# Patient Record
Sex: MALE | Race: WHITE | Employment: STUDENT | URBAN - METROPOLITAN AREA
[De-identification: names, ages, dates, MRNs, and addresses within clinical notes are randomized per-mention and may not be internally consistent; named-entity substitution may affect disease eponyms.]

---
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
## 2022-02-18 ENCOUNTER — TRANSCRIBE ORDER (OUTPATIENT)
Dept: SCHEDULING | Age: 22
End: 2022-02-18

## 2022-02-18 DIAGNOSIS — S43.005A DISLOCATION OF LEFT SHOULDER JOINT: Primary | ICD-10-CM

## 2022-02-21 ENCOUNTER — TRANSCRIBE ORDER (OUTPATIENT)
Dept: SCHEDULING | Age: 22
End: 2022-02-21

## 2022-02-21 DIAGNOSIS — S43.005A SHOULDER DISLOCATION, LEFT, INITIAL ENCOUNTER: Primary | ICD-10-CM

## 2022-02-23 ENCOUNTER — HOSPITAL ENCOUNTER (OUTPATIENT)
Dept: MRI IMAGING | Age: 22
Discharge: HOME | End: 2022-02-23
Attending: ORTHOPAEDIC SURGERY
Payer: COMMERCIAL

## 2022-02-23 ENCOUNTER — TRANSCRIBE ORDER (OUTPATIENT)
Dept: SCHEDULING | Age: 22
End: 2022-02-23

## 2022-02-23 ENCOUNTER — HOSPITAL ENCOUNTER (OUTPATIENT)
Dept: GENERAL RADIOLOGY | Age: 22
Discharge: HOME | End: 2022-02-23
Attending: ORTHOPAEDIC SURGERY
Payer: COMMERCIAL

## 2022-02-23 DIAGNOSIS — S43.005A SHOULDER DISLOCATION, LEFT, INITIAL ENCOUNTER: ICD-10-CM

## 2022-02-23 DIAGNOSIS — S43.005A DISLOCATION OF LEFT SHOULDER JOINT: ICD-10-CM

## 2022-02-23 DIAGNOSIS — S43.005A DISLOCATION OF LEFT SHOULDER JOINT: Primary | ICD-10-CM

## 2022-02-23 PROCEDURE — 23350 INJECTION FOR SHOULDER X-RAY: CPT

## 2022-02-23 PROCEDURE — 74011000250 HC RX REV CODE- 250

## 2022-02-23 PROCEDURE — 74011250636 HC RX REV CODE- 250/636

## 2022-02-23 PROCEDURE — 73222 MRI JOINT UPR EXTREM W/DYE: CPT

## 2022-02-23 PROCEDURE — 74011000636 HC RX REV CODE- 636: Performed by: ORTHOPAEDIC SURGERY

## 2022-02-23 PROCEDURE — 74011000250 HC RX REV CODE- 250: Performed by: ORTHOPAEDIC SURGERY

## 2022-02-23 PROCEDURE — A9576 INJ PROHANCE MULTIPACK: HCPCS

## 2022-02-23 RX ORDER — LIDOCAINE HYDROCHLORIDE 10 MG/ML
INJECTION, SOLUTION EPIDURAL; INFILTRATION; INTRACAUDAL; PERINEURAL
Status: COMPLETED
Start: 2022-02-23 | End: 2022-02-23

## 2022-02-23 RX ORDER — LIDOCAINE HYDROCHLORIDE 10 MG/ML
INJECTION, SOLUTION EPIDURAL; INFILTRATION; INTRACAUDAL; PERINEURAL
Status: DISCONTINUED
Start: 2022-02-23 | End: 2022-02-23 | Stop reason: WASHOUT

## 2022-02-23 RX ORDER — LIDOCAINE HYDROCHLORIDE 10 MG/ML
10 INJECTION, SOLUTION EPIDURAL; INFILTRATION; INTRACAUDAL; PERINEURAL
Status: COMPLETED | OUTPATIENT
Start: 2022-02-23 | End: 2022-02-23

## 2022-02-23 RX ORDER — SODIUM BICARBONATE 42 MG/ML
3 INJECTION, SOLUTION INTRAVENOUS
Status: COMPLETED | OUTPATIENT
Start: 2022-02-23 | End: 2022-02-23

## 2022-02-23 RX ORDER — SODIUM CHLORIDE 9 MG/ML
3 INJECTION INTRAMUSCULAR; INTRAVENOUS; SUBCUTANEOUS
Status: COMPLETED | OUTPATIENT
Start: 2022-02-23 | End: 2022-02-23

## 2022-02-23 RX ADMIN — SODIUM CHLORIDE 3 ML: 9 INJECTION, SOLUTION INTRAMUSCULAR; INTRAVENOUS; SUBCUTANEOUS at 14:39

## 2022-02-23 RX ADMIN — GADOTERIDOL 2 ML: 279.3 INJECTION, SOLUTION INTRAVENOUS at 14:57

## 2022-02-23 RX ADMIN — IOHEXOL 15 ML: 300 INJECTION, SOLUTION INTRAVENOUS at 14:38

## 2022-02-23 RX ADMIN — SODIUM BICARBONATE 3 MG: 42 INJECTION, SOLUTION INTRAVENOUS at 14:40

## 2022-02-23 RX ADMIN — LIDOCAINE HYDROCHLORIDE 5 ML: 10 INJECTION, SOLUTION EPIDURAL; INFILTRATION; INTRACAUDAL; PERINEURAL at 14:40

## 2022-02-26 ENCOUNTER — HOSPITAL ENCOUNTER (EMERGENCY)
Age: 22
Discharge: HOME | End: 2022-02-26
Attending: PEDIATRICS
Payer: COMMERCIAL

## 2022-02-26 VITALS
HEART RATE: 86 BPM | RESPIRATION RATE: 18 BRPM | SYSTOLIC BLOOD PRESSURE: 144 MMHG | DIASTOLIC BLOOD PRESSURE: 78 MMHG | BODY MASS INDEX: 25.48 KG/M2 | WEIGHT: 167.55 LBS | OXYGEN SATURATION: 99 % | TEMPERATURE: 98 F

## 2022-02-26 DIAGNOSIS — L02.01 ABSCESS OF CHIN: Primary | ICD-10-CM

## 2022-02-26 PROCEDURE — 87077 CULTURE AEROBIC IDENTIFY: CPT

## 2022-02-26 PROCEDURE — 87205 SMEAR GRAM STAIN: CPT

## 2022-02-26 PROCEDURE — 90471 IMMUNIZATION ADMIN: CPT

## 2022-02-26 PROCEDURE — 87186 SC STD MICRODIL/AGAR DIL: CPT

## 2022-02-26 PROCEDURE — 90715 TDAP VACCINE 7 YRS/> IM: CPT | Performed by: PEDIATRICS

## 2022-02-26 PROCEDURE — 74011000250 HC RX REV CODE- 250: Performed by: PEDIATRICS

## 2022-02-26 PROCEDURE — 99284 EMERGENCY DEPT VISIT MOD MDM: CPT

## 2022-02-26 PROCEDURE — 74011250637 HC RX REV CODE- 250/637: Performed by: PEDIATRICS

## 2022-02-26 PROCEDURE — 74011250636 HC RX REV CODE- 250/636: Performed by: PEDIATRICS

## 2022-02-26 RX ORDER — DOXYCYCLINE HYCLATE 100 MG
100 TABLET ORAL 2 TIMES DAILY
Qty: 20 TABLET | Refills: 0 | Status: SHIPPED | OUTPATIENT
Start: 2022-02-26 | End: 2022-03-08

## 2022-02-26 RX ORDER — IBUPROFEN 800 MG/1
800 TABLET ORAL
COMMUNITY
Start: 2022-02-21 | End: 2022-03-03

## 2022-02-26 RX ORDER — LIDOCAINE 40 MG/G
CREAM TOPICAL
Status: COMPLETED | OUTPATIENT
Start: 2022-02-26 | End: 2022-02-26

## 2022-02-26 RX ORDER — CYCLOBENZAPRINE HCL 10 MG
10 TABLET ORAL 3 TIMES DAILY
COMMUNITY
Start: 2022-02-21 | End: 2022-03-03

## 2022-02-26 RX ORDER — LIDOCAINE 40 MG/G
CREAM TOPICAL
Status: DISCONTINUED | OUTPATIENT
Start: 2022-02-26 | End: 2022-02-26

## 2022-02-26 RX ORDER — HYDROCODONE BITARTRATE AND ACETAMINOPHEN 5; 325 MG/1; MG/1
1 TABLET ORAL
COMMUNITY
Start: 2022-02-14 | End: 2022-05-06

## 2022-02-26 RX ORDER — DOXYCYCLINE HYCLATE 100 MG
100 TABLET ORAL
Status: COMPLETED | OUTPATIENT
Start: 2022-02-26 | End: 2022-02-26

## 2022-02-26 RX ADMIN — TETANUS TOXOID, REDUCED DIPHTHERIA TOXOID AND ACELLULAR PERTUSSIS VACCINE, ADSORBED 0.5 ML: 5; 2.5; 8; 8; 2.5 SUSPENSION INTRAMUSCULAR at 16:41

## 2022-02-26 RX ADMIN — LIDOCAINE 4%: 4 CREAM TOPICAL at 15:27

## 2022-02-26 RX ADMIN — DOXYCYCLINE HYCLATE 100 MG: 100 TABLET, COATED ORAL at 16:40

## 2022-02-26 NOTE — ED NOTES
Patient medicated with first dose of doxycycline and tdap booster. Patient provided vaccine info sheet. No further needs expressed at this time.

## 2022-02-26 NOTE — DISCHARGE INSTRUCTIONS
Take antibiotics as prescribed.       Up with your physician in 1 to 2 days    Return to the emergency department for any worsening symptoms including any trouble breathing, fevers, redness or swelling of area of abscess, any feeling of fullness or difficulty swallowing, inability to swallow saliva, pain or other new concerns

## 2022-02-26 NOTE — ED TRIAGE NOTES
Triage: patient reports he popped pimple under chin on 2/22/2022. Patient reports pimple has grown in size since then and is painful.  Fever on 2/23

## 2022-02-26 NOTE — ED NOTES
Abscess dressed with sterile 4x4 and tape. Patient educated on plan of care regarding care of wound at home. Patient verbalizes understanding and expresses no needs at this time. Pt discharged home. Pt acting age appropriately, respirations regular and unlabored, cap refill less than two seconds. Skin warm, dry, and intact. Lungs clear bilaterally. No further complaints at this time. Patient verbalized understanding of discharge paperwork and has no further questions at this time. Education provided about continuation of care, follow up care and medication administration. Patient able to provide teach back about discharge instructions.

## 2022-02-26 NOTE — ED PROVIDER NOTES
HPI           Please note that this dictation was completed with Dragon, computer voice recognition software. Quite often unanticipated grammatical, syntax, homophones, and other interpretive errors are inadvertently transcribed by the computer software. Please disregard these errors. Additionally, please excuse any errors that have escaped final proofreading. History of present illness:    Patient is a 29-year-old male previously well presents to the ED with complaints of drainage abscess beneath his chin. He states he was in his usual state of good health until approximately 4 days earlier when he popped a pimple. He states he expressed large amount of yellowish material which then became bloody. Since that time he states that it has increased in size and is still draining. Patient states he had a fever on 2/23 which was 3 days earlier x1 day patient felt hot positive tactile temp no documentation performed. He has been afebrile since. Patient now presents to the ED for reevaluation. No fevers no vomiting no diarrhea. No trouble swallowing or eating. Able to eat and drink well without any problems. No headache no change in vision no neck pain no swelling of neck. Positive yellowish discharge from abscess beneath chin. No tooth pain. No chest pain no abdominal pain no nausea no vomiting no dysuria. No other complaints no modifying factors no other concerns    Review of systems: A 10 point review was conducted. All pertinent positive and negatives are as stated in the HPI  Allergies: None  Medications: Cyclobenzaprine ibuprofen  Immunizations: Up-to-date has had only one Covid vaccine  Past medical history: Positive for left dislocated shoulder which was relocated in ER and followed by Ortho.   Patient currently in sling but no problems with shoulder  Family history: Noncontributory to this visit  Social history: Lives with family denies drug use  Past Medical History:   Diagnosis Date    Depression     GERD (gastroesophageal reflux disease)     Headache        History reviewed. No pertinent surgical history. Family History:   Problem Relation Age of Onset    Hypertension Mother    Jules Weinberg Migraines Mother     No Known Problems Father        Social History     Socioeconomic History    Marital status: SINGLE     Spouse name: Not on file    Number of children: Not on file    Years of education: Not on file    Highest education level: Not on file   Occupational History    Not on file   Tobacco Use    Smoking status: Never Smoker    Smokeless tobacco: Current User   Vaping Use    Vaping Use: Some days    Substances: THC   Substance and Sexual Activity    Alcohol use: Yes     Comment: 1/week    Drug use: Yes     Types: Marijuana     Comment: around once per week    Sexual activity: Yes     Partners: Male   Other Topics Concern    Not on file   Social History Narrative    Not on file     Social Determinants of Health     Financial Resource Strain:     Difficulty of Paying Living Expenses: Not on file   Food Insecurity:     Worried About Running Out of Food in the Last Year: Not on file    Marisol of Food in the Last Year: Not on file   Transportation Needs:     Lack of Transportation (Medical): Not on file    Lack of Transportation (Non-Medical):  Not on file   Physical Activity:     Days of Exercise per Week: Not on file    Minutes of Exercise per Session: Not on file   Stress:     Feeling of Stress : Not on file   Social Connections:     Frequency of Communication with Friends and Family: Not on file    Frequency of Social Gatherings with Friends and Family: Not on file    Attends Sikh Services: Not on file    Active Member of Clubs or Organizations: Not on file    Attends Club or Organization Meetings: Not on file    Marital Status: Not on file   Intimate Partner Violence:     Fear of Current or Ex-Partner: Not on file    Emotionally Abused: Not on file   Jules Sultana Physically Abused: Not on file    Sexually Abused: Not on file   Housing Stability:     Unable to Pay for Housing in the Last Year: Not on file    Number of Places Lived in the Last Year: Not on file    Unstable Housing in the Last Year: Not on file         ALLERGIES: Patient has no known allergies. Review of Systems   Constitutional: Positive for fever. Negative for activity change and appetite change. HENT: Negative for congestion, rhinorrhea, sore throat and trouble swallowing. Eyes: Negative for pain, redness and visual disturbance. Respiratory: Negative for cough and shortness of breath. Gastrointestinal: Negative for abdominal pain, diarrhea and vomiting. Genitourinary: Negative for decreased urine volume and difficulty urinating. Musculoskeletal: Negative for gait problem. Skin: Positive for wound. Neurological: Negative for weakness. All other systems reviewed and are negative. Vitals:    02/26/22 1509 02/26/22 1510 02/26/22 1650 02/26/22 1710   BP: (!) 157/98  (!) 138/90 (!) 144/78   Pulse: 83   86   Resp: 18   18   Temp: 98 °F (36.7 °C)   98 °F (36.7 °C)   SpO2: 100%   99%   Weight:  76 kg (167 lb 8.8 oz)              Physical Exam  Vitals and nursing note reviewed. PE:  GEN:  WDWN male alert non toxic in NAD talkative interactive well appearing, normal voice  SK: CRT < 2 sec, good distal pulses. No lesions, no rashes  HEENT: H: AT/NC. E: EOMI , PERRL, E: TM clear  N/T: Clear oropharynx, teeth intact, no submental swelling/flucuance  Chin: about 2 cm distally from chin,  + 1x2 CM  Soft, fluctuant abscess actively draining dark yellow/brownish exudate, no surrounding erythema, + bruising of skin ( previous squeezing by pt)  NECK: supple, no meningismus. No pain on palpation, no redness,s no cellulitis, no swelling/deformity  Chest: Clear to auscultation, clear BS. NO rales, rhonchi, wheezes or distress. No   Retraction.   Chest Wall: no tenderness on palpation  CV: Regular rate and rhythm. Normal S1 S2 . No murmur, gallops or thrills  ABD: Soft non tender, no hepatomegaly, good bowel sound, no guarding, benig  MS: FROM all extremities, no long bone tenderness. No swelling, cyanosis, no edema. Good distal pulses. Gait normal  NEURO: Alert. No focality. Cranial nerves 2-12 grossly intact. GCS 15  Behavior and mentation appropriate for age        MDM  Number of Diagnoses or Management Options  Abscess of chin  Diagnosis management comments: Medical decision making:    Patient with abscess secondary to his self popping of acne. After L MX applied abscess spontaneously drainage, large amount expressed with mild pressure manually. See procedure note    Wound cleansed again with Betadine and irrigated NS, dressing placed    First dose doxycycline given in ER    Tetanus also given to patient    All precautions reviewed with patient. He is understanding and agreeable to plan. He will follow-up with PCP or return to the ER in 1 to 2 days for reevaluation and return immediately for any worsening symptoms including any trouble breathing, fevers, redness or swelling of neck, any inability to swallow liquids food or his saliva, or other new concerns    Patient states recently moved to [address] [facility] and does not have physician    Patient also states that he will be going on spring break to the beach in 3 days.   Advised patient not to go in the ocean keep wound clean and dry and no drinking alcohol while on doxycycline    Clinical impression:  Abscess chin       Amount and/or Complexity of Data Reviewed  Clinical lab tests: ordered           I&D Abcess Simple    Date/Time: 2/26/2022 6:52 PM  Performed by: Fernando Rascon MD  Authorized by: Fernando Rascon MD     Consent:     Consent obtained:  Verbal    Consent given by:  Patient    Risks discussed:  Bleeding, pain, infection and damage to other organs    Alternatives discussed:  No treatment  Universal protocol:     Procedure explained and questions answered to patient or proxy's satisfaction: yes      Relevant documents present and verified: yes      Site/side marked: yes      Immediately prior to procedure a time out was called: yes      Patient identity confirmed:  Verbally with patient and arm band  Location:     Type:  Abscess    Size:  2cm    Location: chin/upperneck. Pre-procedure details:     Skin preparation:  Betadine  Sedation:     Sedation type: none. Anesthesia (see MAR for exact dosages): Anesthesia method:  Topical application    Topical anesthetic:  EMLA cream  Procedure type:     Complexity:  Simple  Procedure details:     Needle aspiration: no      Drainage:  Bloody and purulent    Drainage amount:  Copious    Wound treatment:  Wound left open    Packing materials:  None  Post-procedure details:     Patient tolerance of procedure:   Tolerated well, no immediate complications  Comments:      Abscess - spontaneously draining, no incision required

## 2022-02-28 ENCOUNTER — HOSPITAL ENCOUNTER (EMERGENCY)
Age: 22
Discharge: HOME | End: 2022-02-28
Attending: EMERGENCY MEDICINE
Payer: COMMERCIAL

## 2022-02-28 VITALS
BODY MASS INDEX: 25.48 KG/M2 | RESPIRATION RATE: 17 BRPM | WEIGHT: 167.55 LBS | SYSTOLIC BLOOD PRESSURE: 145 MMHG | DIASTOLIC BLOOD PRESSURE: 87 MMHG | TEMPERATURE: 97.9 F | HEART RATE: 97 BPM | OXYGEN SATURATION: 99 %

## 2022-02-28 DIAGNOSIS — L02.01 ABSCESS OF CHIN: Primary | ICD-10-CM

## 2022-02-28 LAB
BACTERIA SPEC CULT: ABNORMAL
BACTERIA SPEC CULT: ABNORMAL
GRAM STN SPEC: ABNORMAL
GRAM STN SPEC: ABNORMAL
SERVICE CMNT-IMP: ABNORMAL

## 2022-02-28 PROCEDURE — 74011000250 HC RX REV CODE- 250: Performed by: NURSE PRACTITIONER

## 2022-02-28 PROCEDURE — 99283 EMERGENCY DEPT VISIT LOW MDM: CPT

## 2022-02-28 RX ORDER — MUPIROCIN CALCIUM 20 MG/G
CREAM TOPICAL 2 TIMES DAILY
Qty: 15 G | Refills: 0 | Status: SHIPPED | OUTPATIENT
Start: 2022-02-28 | End: 2022-03-10

## 2022-02-28 RX ORDER — LIDOCAINE 40 MG/G
CREAM TOPICAL
Status: COMPLETED | OUTPATIENT
Start: 2022-02-28 | End: 2022-02-28

## 2022-02-28 RX ADMIN — LIDOCAINE 4%: 4 CREAM TOPICAL at 16:41

## 2022-02-28 NOTE — ED PROVIDER NOTES
This is a 72-year-old male with history of depression, GERD and migraines seen here on 2/26 for an abscess underneath his chin. He does not have any PCP for follow-up/repeat exam so he came here. He states that the wound opened spontaneously after numbing medication was applied to it. He was able to continue draining for about 24 hours and then it did close up. He said he has been performing warm compresses and washing it with soap and water as well. He does feel like the pain has improved and the swelling and redness has improved as well. No fevers. No neck pain or decreased neck movements. He has been taking doxyclycline, no complaints or side effects. Past medical history: Depression, GERD, migraines  Social: Vaccines up-to-date lives in with family    The history is provided by the patient. Skin Problem  Pertinent negatives include no chest pain and no headaches. Past Medical History:   Diagnosis Date    Depression     GERD (gastroesophageal reflux disease)     Headache        No past surgical history on file.       Family History:   Problem Relation Age of Onset    Hypertension Mother    [facility] Migraines Mother     No Known Problems Father        Social History     Socioeconomic History    Marital status: SINGLE     Spouse name: Not on file    Number of children: Not on file    Years of education: Not on file    Highest education level: Not on file   Occupational History    Not on file   Tobacco Use    Smoking status: Never Smoker    Smokeless tobacco: Current User   Vaping Use    Vaping Use: Some days    Substances: THC   Substance and Sexual Activity    Alcohol use: Yes     Comment: 1/week    Drug use: Yes     Types: Marijuana     Comment: around once per week    Sexual activity: Yes     Partners: Male   Other Topics Concern    Not on file   Social History Narrative    Not on file     Social Determinants of Health     Financial Resource Strain:     Difficulty of Paying Living Expenses: Not on file   Food Insecurity:     Worried About Running Out of Food in the Last Year: Not on file    Marisol of Food in the Last Year: Not on file   Transportation Needs:     Lack of Transportation (Medical): Not on file    Lack of Transportation (Non-Medical): Not on file   Physical Activity:     Days of Exercise per Week: Not on file    Minutes of Exercise per Session: Not on file   Stress:     Feeling of Stress : Not on file   Social Connections:     Frequency of Communication with Friends and Family: Not on file    Frequency of Social Gatherings with Friends and Family: Not on file    Attends Rastafarian Services: Not on file    Active Member of [employer] or Organizations: Not on file    Attends Club or Organization Meetings: Not on file    Marital Status: Not on file   Intimate Partner Violence:     Fear of Current or Ex-Partner: Not on file    Emotionally Abused: Not on file    Physically Abused: Not on file    Sexually Abused: Not on file   Housing Stability:     Unable to Pay for Housing in the Last Year: Not on file    Number of [place] in the Last Year: Not on file    Unstable Housing in the Last Year: Not on file         ALLERGIES: Patient has no known allergies. Review of Systems   Constitutional: Negative. Negative for activity change, appetite change and fever. HENT: Negative. Negative for sore throat. Respiratory: Negative. Negative for cough and wheezing. Cardiovascular: Negative. Negative for chest pain. Gastrointestinal: Negative. Negative for diarrhea and vomiting. Genitourinary: Negative. Musculoskeletal: Negative. Negative for back pain and neck pain. Skin: Positive for wound. Negative for rash. Chin abscess   Neurological: Negative. Negative for headaches. All other systems reviewed and are negative.       Vitals:    02/28/22 1445 02/28/22 1447   BP:  (!) 145/87   Pulse:  97   Resp:  17   Temp:  97.9 °F (36.6 °C)   SpO2:  99% Weight: 76 kg (167 lb 8.8 oz)             Physical Exam  Vitals and nursing note reviewed. Constitutional:       Appearance: Normal appearance. Musculoskeletal:         General: Normal range of motion. Skin:     General: Skin is warm. Capillary Refill: Capillary refill takes less than 2 seconds. Findings: Abscess present. Comments: Small (about 1 cm) semi firm area midline anterior neck; no surrounding erythema or tenderness to palpation; some crusted drainage remains on skin. Neurological:      General: No focal deficit present. Mental Status: He is alert. Mental status is at baseline. Psychiatric:         Mood and Affect: Mood normal.          MDM  Number of Diagnoses or Management Options  Abscess of chin  Diagnosis management comments: This is a 42-year-old male with anterior midline neck abscess that was spontaneously drained after topical anesthetic applied 2 days ago. It did close up and stopped draining. On my exam there is still very small area of induration under the skin given that there is some dried crusted drainage will apply topical anesthetic in hopes that it will reopen and be able to drain more. He is currently taking doxycycline and I reviewed the culture and sensitivities which was responsive to doxycycline he had positive MRSA. Amount and/or Complexity of Data Reviewed  Review and summarize past medical records: yes    Risk of Complications, Morbidity, and/or Mortality  Presenting problems: moderate  Diagnostic procedures: moderate  Management options: moderate    Patient Progress  Patient progress: stable         Procedures    LMX applied, wound did not open or drain anything else; He denies pain at this time and it is not indurated or fluctuant so will keep him on doxycycline and topical bactroban. We discussed return for worsening symptoms or pain. Patient's results have been reviewed with them.  Patient and /or family have verbally conveyed understanding and agreement of the patient's signs, symptoms, diagnosis, treatment and prognosis and additionally agree to follow up as recommended or return to the Emergency Department should their condition change prior to follow-up. Discharge instructions have also been provided to the patient with some educational information regarding their diagnosis as well as a list of reasons why they would want to return to the ER prior to their follow-up appointment should their condition change.

## 2022-02-28 NOTE — ED TRIAGE NOTES
Triage note: Patient had skin abscess drained earlier this week, here for follow up b/c he does not have PCP.

## 2022-02-28 NOTE — DISCHARGE INSTRUCTIONS
Continue antibiotics as prescribed (the culture/bacteria grew MRSA and the doxycycline is a good antibiotic for that bacteria)  Continue warm compresses on abscess to try to keep wound open and draining as much as possible  Only need to return for any concerns or worsening symptoms.

## 2022-02-28 NOTE — LETTER
[address]  [address] [facility] DEPT  [address] [facility] [address]  [phone]    Work/School Note    Date: 2/28/2022    To Whom It May concern:    Selene Ray was seen and treated today in the emergency room by the following provider(s):  Attending Provider: Christine Palmer MD  Nurse Practitioner: Jade Luz NP. Selene Ray may return to school on 3/1/22.     Sincerely,          Romaine Cam NP

## 2022-05-06 ENCOUNTER — HOSPITAL ENCOUNTER (EMERGENCY)
Age: 22
Discharge: HOME | End: 2022-05-06
Attending: PEDIATRICS
Payer: COMMERCIAL

## 2022-05-06 ENCOUNTER — APPOINTMENT (OUTPATIENT)
Dept: GENERAL RADIOLOGY | Age: 22
End: 2022-05-06
Attending: PEDIATRICS
Payer: COMMERCIAL

## 2022-05-06 VITALS
WEIGHT: 160.5 LBS | DIASTOLIC BLOOD PRESSURE: 78 MMHG | HEART RATE: 95 BPM | RESPIRATION RATE: 16 BRPM | BODY MASS INDEX: 24.4 KG/M2 | TEMPERATURE: 98 F | SYSTOLIC BLOOD PRESSURE: 168 MMHG | OXYGEN SATURATION: 99 %

## 2022-05-06 DIAGNOSIS — M25.512 ACUTE PAIN OF LEFT SHOULDER: Primary | ICD-10-CM

## 2022-05-06 PROCEDURE — 74011250637 HC RX REV CODE- 250/637: Performed by: PEDIATRICS

## 2022-05-06 PROCEDURE — 73030 X-RAY EXAM OF SHOULDER: CPT

## 2022-05-06 PROCEDURE — 99283 EMERGENCY DEPT VISIT LOW MDM: CPT

## 2022-05-06 RX ORDER — HYDROCODONE BITARTRATE AND ACETAMINOPHEN 5; 325 MG/1; MG/1
1 TABLET ORAL
Qty: 12 TABLET | Refills: 0 | Status: SHIPPED | OUTPATIENT
Start: 2022-05-06 | End: 2022-05-09

## 2022-05-06 RX ORDER — HYDROCODONE BITARTRATE AND ACETAMINOPHEN 5; 325 MG/1; MG/1
1 TABLET ORAL
Status: COMPLETED | OUTPATIENT
Start: 2022-05-06 | End: 2022-05-06

## 2022-05-06 RX ORDER — METHOCARBAMOL 500 MG/1
500 TABLET, FILM COATED ORAL 2 TIMES DAILY
COMMUNITY
Start: 2022-03-03

## 2022-05-06 RX ADMIN — HYDROCODONE BITARTRATE AND ACETAMINOPHEN 1 TABLET: 5; 325 TABLET ORAL at 20:45

## 2022-05-06 NOTE — ED TRIAGE NOTES
Triage Note: Pt reports he has dislocated shoulder twice. Pt reports he woke up this morning and left shoulder felt funny. He is worried it is partially out of place.

## 2022-05-07 NOTE — ED PROVIDER NOTES
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 n sling now and did maneuvers at home and it is somewhat better now. Still painful. No New injury. Has ortho appointment in 2 days. NO recent illness. No Neuropathy now. IMM UTD    Past Medical History:   Diagnosis Date    Depression     GERD (gastroesophageal reflux disease)     Headache        History reviewed. No pertinent surgical history. Family History:   Problem Relation Age of Onset    Hypertension Mother    [facility] Migraines Mother     No Known Problems Father        Social History     Socioeconomic History    Marital status: SINGLE     Spouse name: Not on file    Number of children: Not on file    Years of education: Not on file    Highest education level: Not on file   Occupational History    Not on file   Tobacco Use    Smoking status: Never Smoker    Smokeless tobacco: Current User   Vaping Use    Vaping Use: Some days    Substances: THC   Substance and Sexual Activity    Alcohol use: Yes     Comment: 1/week    Drug use: Yes     Types: Marijuana     Comment: around once per week    Sexual activity: Yes     Partners: Male   Other Topics Concern    Not on file   Social History Narrative    Not on file     Social Determinants of Health     Financial Resource Strain:     Difficulty of Paying Living Expenses: Not on file   Food Insecurity:     Worried About Running Out of Food in the Last Year: Not on file    Marisol of Food in the Last Year: Not on file   Transportation Needs:     Lack of Transportation (Medical): Not on file    Lack of Transportation (Non-Medical):  Not on file   Physical Activity:     Days of Exercise per Week: Not on file    Minutes of Exercise per Session: Not on file   Stress:     Feeling of Stress : Not on file   Social Connections:  Frequency of Communication with Friends and Family: Not on file    Frequency of Social Gatherings with Friends and Family: Not on file    Attends Restoration Services: Not on file    Active Member of Clubs or Organizations: Not on file    Attends Club or Organization Meetings: Not on file    Marital Status: Not on file   Intimate Partner Violence:     Fear of Current or Ex-Partner: Not on file    Emotionally Abused: Not on file    Physically Abused: Not on file    Sexually Abused: Not on file   Housing Stability:     Unable to Pay for Housing in the Last Year: Not on file    Number of [place] in the Last Year: Not on file    Unstable Housing in the Last Year: Not on file         ALLERGIES: Patient has no known allergies. Review of Systems   Constitutional: Negative for activity change and fatigue. HENT: Negative for sore throat. Eyes: Negative for visual disturbance. Respiratory: Negative for chest tightness and shortness of breath. Cardiovascular: Negative for chest pain. Gastrointestinal: Negative for abdominal pain. Musculoskeletal: Positive for arthralgias and myalgias. Negative for joint swelling. Skin: Negative for rash. Allergic/Immunologic: Negative for immunocompromised state. Neurological: Negative for light-headedness and headaches. Hematological: Does not bruise/bleed easily. Psychiatric/Behavioral: The patient is nervous/anxious. Vitals:    05/06/22 1901   BP: (!) 168/78   Pulse: 95   Resp: 16   Temp: 98 °F (36.7 °C)   SpO2: 99%   Weight: 72.8 kg (160 lb 7.9 oz)            Physical Exam   Physical Exam   Constitutional: Appears well-developed and well-nourished. active. No distress. HENT:   Head: NCAT  Nose: Nose normal. No nasal discharge. Mouth/Throat: Mucous membranes are moist. Pharynx is normal.   Eyes: Conjunctivae are normal. Right eye exhibits no discharge. Left eye exhibits no discharge. Neck: Normal range of motion. Neck supple. Cardiovascular: Normal rate,      2+ distal pulses   Pulmonary/Chest: Effort normal and breath sounds normal.  Musculoskeletal:  Left arm in sling NV intact. Tender over anterior shoulder. No deformity  Lymphadenopathy:     no cervical adenopathy. Neurological:  alert. normal strength. normal muscle tone. No focal defecits  Skin: Skin is warm and dry. Capillary refill takes less than 3 seconds. Turgor is normal. No petechiae, no purpura and no rash noted. No cyanosis. MDM     Patient is well hydrated, well appearing, and in no respiratory distress. Physical exam is reassuring, and without signs of serious illness. Capillary refill time, pulses and neurovascular function are normal.  Pt with negative XR. Pain control and leave in sling. Reviewed MRI with patient. Has ortho appointment on Monday        ICD-10-CM ICD-9-CM   1. Acute pain of left shoulder  M25.512 719.41       Current Discharge Medication List      START taking these medications    Details   HYDROcodone-acetaminophen (Norco) 5-325 mg per tablet Take 1 Tablet by mouth every six (6) hours as needed for Pain for up to 3 days. Max Daily Amount: 4 Tablets. Qty: 12 Tablet, Refills: 0  Start date: 5/6/2022, End date: 5/9/2022    Associated Diagnoses: Acute pain of left shoulder             Follow-up Information     Follow up With Specialties Details Why [address] [place], [place] [address]Aldair MD (Jody) Orthopedic Surgery On 5/9/2022  [address]  [phone]            I have reviewed discharge instructions with the parent. The parent verbalized understanding. 8:46 PM  Katlin Ortiz M.D.         Procedures

## 2022-05-07 NOTE — DISCHARGE INSTRUCTIONS
If your doctor put your arm in a sling or shoulder immobilizer, wear it as directed. Take pain medicines exactly as directed. If the doctor gave you a prescription medicine for pain, take it as prescribed. If you are not taking a prescription pain medicine, ask your doctor if you can take an over-the-counter medicine. Put ice or a cold pack on your shoulder for 10 to 20 minutes at a time. Try to do this every 1 to 2 hours for the next 3 days (when you are awake). Put a thin cloth between the ice and your skin. You may use warm packs after the first 3 days for 15 to 20 minutes at a time. This can ease pain. If your doctor gave you exercises to do at home, do them exactly as your doctor told you. Do not do anything that makes the pain worse.

## 2022-09-15 NOTE — CALL BACK NOTE
Called to perform service recovery. Spoke to patient and mother and apologized for the wait. They went to [facility] ED where he was seen evaluated and treated. Pt and mother seem appreciative of the call.
[facility]

## 2025-06-20 ENCOUNTER — OFFICE VISIT (OUTPATIENT)
Age: 25
End: 2025-06-20

## 2025-06-20 VITALS
SYSTOLIC BLOOD PRESSURE: 115 MMHG | DIASTOLIC BLOOD PRESSURE: 76 MMHG | OXYGEN SATURATION: 96 % | HEART RATE: 87 BPM | RESPIRATION RATE: 16 BRPM | TEMPERATURE: 98.6 F | WEIGHT: 152 LBS

## 2025-06-20 DIAGNOSIS — S09.90XA INJURY OF HEAD, INITIAL ENCOUNTER: ICD-10-CM

## 2025-06-20 DIAGNOSIS — S06.0X0A CONCUSSION WITHOUT LOSS OF CONSCIOUSNESS, INITIAL ENCOUNTER: Primary | ICD-10-CM

## 2025-06-20 DIAGNOSIS — Z75.8 DOES NOT HAVE PRIMARY CARE PROVIDER: ICD-10-CM

## 2025-06-20 DIAGNOSIS — J32.9 RHINOSINUSITIS: ICD-10-CM

## 2025-06-20 RX ORDER — EMTRICITABINE AND TENOFOVIR DISOPROXIL FUMARATE 200; 300 MG/1; MG/1
1 TABLET, FILM COATED ORAL DAILY
COMMUNITY
Start: 2025-05-07

## 2025-06-20 NOTE — PROGRESS NOTES
Flynn Avendano (:  2000) is a 24 y.o. male,New patient, here for evaluation of the following chief complaint(s):  Head Injury (Pt presents to clinic for head injury. Reports injury occurred 06/15/2025 after hitting the back of his head at a pool. Reports headache/soreness and brain fog. Denies visual changes.//Pt also reporting some sinus pressure/congestion. Reports he was prescribed abx but did not take it as prescribed and is worried he may still have an infection.)        SUBJECTIVE/OBJECTIVE:    Head Injury          24 y.o. male presents with symptoms of head injury that occurred  (6/15/2025, 5 days ago).  He fell back onto pool deck steps hitting the back of his head.  Was on a trip in [place].  He denies any LOC.  He denies any cuts or bleeding.  Woke the next day and very painful, back of head was tender.  He admits to the back of his head still being sore and having generalized headaches and brain fog, no thunderclap onset and not the worst headache of his life.  He did note a bump in the back of his head that is gone down, but the area is still sore to touch.  He denies any vision changes, vision loss, double vision, and blurry vision.  He denies any numbness or tingling.  He denies any weakness.  He denies any difficulty speaking.  He denies any photophobia or phonophobia.  He denies any nausea or vomiting.  He does not take any blood thinners.    He also states before he hit his head he was treated for a sinus infection and congestion and was prescribed amoxicillin twice daily, but accidentally took it once daily.  Overall he feels better but not completely recovered.  He states he is still having a little bit of frontal sinus pressure and coughing up some mucus from the throat.  He denies any fevers or chills.  He denies any chest pain or shortness of breath.         Vitals:    25 1428   BP: 115/76   BP Site: Left Upper Arm   Patient Position: Sitting   BP Cuff Size: Medium

## 2025-06-20 NOTE — PATIENT INSTRUCTIONS
Head injury/concussion -  Recommend cognitive rest  Recommend limiting screen time, no flashy movies  Can take over-the-counter acetaminophen as needed for headache  Drink plenty fluids  Recommend follow-up with your primary care provider  If any worsening or development of new symptoms please go to the nearest emergency room  Call or return to clinic if any concerns    Rhinosinusitis -  Please see below for symptomatic treatment recommendations:  (Recommend holding off on Sudafed or NSAIDs (ibuprofen) for now given your recent head injury)    Nasal Congestion:  Steroid nasal spray, such as Flonase, Nasonex, or Nasacort  Normal saline nasal spray  Afrin nasal spray no longer than three days  Oral decongestants, such as Sudafed/pseudoephedrine  Do not take if you have a history of high blood pressure, take Coricidin HBP (or the generic form) instead. Follow instructions on the box  Cough:  Throat lozenges, hot tea, and honey may help  Vicks VapoRub at night to help with cough and relieve muscles aches and pain  If not prescribed a cough medication, Delsym is an option. It is an over the counter cough medication containing dextromethorphan to help suppress cough at night   *Please only take when absolutely needed, as this is a controlled substance that can cause addiction   *Please only take cough syrup at nighttime as it causes drowsiness   *Do not drive or operate any machinery while taking this medication  Chest Congestion:  For thick mucus, take an expectorant (guaifenesin only) to help thin the mucus. Follow instructions on the box. You will need to drink plenty of water with this medication  Sore Throat:  Lozenges, as needed. Cepacol lozenges will help numb the throat  Chloraseptic spray also helps to numb throat pain  Salt water gargles (1/4 tsp salt in 8 oz of water) to soothe throat pain  Sinus pain/pressure:  Warm, wet towel on face to help with facial sinus pain/pressure  Headache/Pain Fever/Body Aches:  If

## 2025-07-08 ENCOUNTER — OFFICE VISIT (OUTPATIENT)
Age: 25
End: 2025-07-08
Payer: COMMERCIAL

## 2025-07-08 VITALS
DIASTOLIC BLOOD PRESSURE: 69 MMHG | TEMPERATURE: 97.5 F | RESPIRATION RATE: 20 BRPM | BODY MASS INDEX: 23.67 KG/M2 | OXYGEN SATURATION: 97 % | WEIGHT: 159.8 LBS | HEIGHT: 69 IN | HEART RATE: 60 BPM | SYSTOLIC BLOOD PRESSURE: 111 MMHG

## 2025-07-08 DIAGNOSIS — Z29.81 ENCOUNTER FOR HIV PRE-EXPOSURE PROPHYLAXIS: ICD-10-CM

## 2025-07-08 DIAGNOSIS — Z11.3 SCREEN FOR STD (SEXUALLY TRANSMITTED DISEASE): ICD-10-CM

## 2025-07-08 DIAGNOSIS — Z76.89 ENCOUNTER TO ESTABLISH CARE: ICD-10-CM

## 2025-07-08 DIAGNOSIS — Z11.3 SCREEN FOR STD (SEXUALLY TRANSMITTED DISEASE): Primary | ICD-10-CM

## 2025-07-08 DIAGNOSIS — Z71.84 COUNSELING FOR TRAVEL: ICD-10-CM

## 2025-07-08 PROCEDURE — 99204 OFFICE O/P NEW MOD 45 MIN: CPT | Performed by: STUDENT IN AN ORGANIZED HEALTH CARE EDUCATION/TRAINING PROGRAM

## 2025-07-08 SDOH — ECONOMIC STABILITY: FOOD INSECURITY: WITHIN THE PAST 12 MONTHS, THE FOOD YOU BOUGHT JUST DIDN'T LAST AND YOU DIDN'T HAVE MONEY TO GET MORE.: PATIENT DECLINED

## 2025-07-08 SDOH — ECONOMIC STABILITY: FOOD INSECURITY: WITHIN THE PAST 12 MONTHS, YOU WORRIED THAT YOUR FOOD WOULD RUN OUT BEFORE YOU GOT MONEY TO BUY MORE.: PATIENT DECLINED

## 2025-07-08 ASSESSMENT — ENCOUNTER SYMPTOMS
VOMITING: 0
SHORTNESS OF BREATH: 0
NAUSEA: 0
COUGH: 0
BLOOD IN STOOL: 0
ABDOMINAL PAIN: 0
CONSTIPATION: 0
DIARRHEA: 0

## 2025-07-08 ASSESSMENT — PATIENT HEALTH QUESTIONNAIRE - PHQ9
SUM OF ALL RESPONSES TO PHQ QUESTIONS 1-9: 0
1. LITTLE INTEREST OR PLEASURE IN DOING THINGS: NOT AT ALL
SUM OF ALL RESPONSES TO PHQ QUESTIONS 1-9: 0
2. FEELING DOWN, DEPRESSED OR HOPELESS: NOT AT ALL
SUM OF ALL RESPONSES TO PHQ QUESTIONS 1-9: 0
SUM OF ALL RESPONSES TO PHQ QUESTIONS 1-9: 0

## 2025-07-08 NOTE — PATIENT INSTRUCTIONS
[facility]   [address] [place]  [address]   Monday-Friday 9am-6pm   [phone]     [facility]

## 2025-07-08 NOTE — ASSESSMENT & PLAN NOTE
Continue Truvada. Will have STD screening labs, CBC, and CMP today and once resulted I will send in a 90 day Rx for him. Discussed need for an office visit/exam every 6 months and labs every three months and he voices understanding.

## 2025-07-08 NOTE — PROGRESS NOTES
Flynn Avendano is a 24 y.o. year old male who is a new patient to me today. He was previously followed by [facility].      Assessment & Plan:   1. Screen for STD (sexually transmitted disease)  -     Comprehensive Metabolic Panel; Future  -     CBC with Auto Differential; Future  -     HIV 1/2 Ag/Ab, 4TH Generation,W Rflx Confirm; Future  -     Chlamydia, Gonorrhea, Trichomoniasis; Future  -     HSV 1 and 2 Specific Ab, IgG; Future  -     Hepatitis B Surface Antibody; Future  -     Hepatitis B Surface Antigen; Future  -     Hepatitis C Ab, Rflx to Qt by PCR; Future  -     RPR; Future  2. Encounter for HIV pre-exposure prophylaxis  Assessment & Plan:  Continue Truvada. Will have STD screening labs, CBC, and CMP today and once resulted I will send in a 90 day Rx for him. Discussed need for an office visit/exam every 6 months and labs every three months and he voices understanding.   3. Counseling for travel         Provided information for travel clinics in the area to discuss vaccines/prophylaxis recommendations. He will send me a [employer] message prior to his trip and will also send Rx for Zofran and abx for traveler's diarrhea.   4. Encounter to establish care  Reviewed history and HM, requested records from pediatrics to update vaccine history.       Return in about 6 months (around 1/8/2026) for Followup.    History/Subjective   Chief Complaint: Establish Care      History of Present Illness  The patient presents to establish care. He has no significant past medical history and denies any acute concerns today.     PrEP  He is on Truvada for the past 4 years. Previously prescribed by student health at [employer] and would like for me to take this over for him. He has about 30 days of medication remaining.     He notes that he is planning a trip to [place] at the end of the year, will be gone a little over 3 months. He recently graduated from [employer] with a degree in interior design and is going to [place] [place] for an

## 2025-07-09 LAB
ALBUMIN SERPL-MCNC: 4.2 G/DL (ref 3.5–5)
ALBUMIN/GLOB SERPL: 1.5 (ref 1.1–2.2)
ALP SERPL-CCNC: 100 U/L (ref 45–117)
ALT SERPL-CCNC: 40 U/L (ref 12–78)
ANION GAP SERPL CALC-SCNC: 5 MMOL/L (ref 2–12)
AST SERPL-CCNC: 26 U/L (ref 15–37)
BASOPHILS # BLD: 0.03 K/UL (ref 0–0.1)
BASOPHILS NFR BLD: 0.6 % (ref 0–1)
BILIRUB SERPL-MCNC: 0.8 MG/DL (ref 0.2–1)
BUN SERPL-MCNC: 16 MG/DL (ref 6–20)
BUN/CREAT SERPL: 17 (ref 12–20)
CALCIUM SERPL-MCNC: 9.7 MG/DL (ref 8.5–10.1)
CHLORIDE SERPL-SCNC: 104 MMOL/L (ref 97–108)
CO2 SERPL-SCNC: 29 MMOL/L (ref 21–32)
CREAT SERPL-MCNC: 0.93 MG/DL (ref 0.7–1.3)
DIFFERENTIAL METHOD BLD: NORMAL
EOSINOPHIL # BLD: 0.05 K/UL (ref 0–0.4)
EOSINOPHIL NFR BLD: 1 % (ref 0–7)
ERYTHROCYTE [DISTWIDTH] IN BLOOD BY AUTOMATED COUNT: 12.2 % (ref 11.5–14.5)
GLOBULIN SER CALC-MCNC: 2.8 G/DL (ref 2–4)
GLUCOSE SERPL-MCNC: 99 MG/DL (ref 65–100)
HBV SURFACE AB SER QL: NONREACTIVE
HBV SURFACE AB SER-ACNC: <3.1 MIU/ML
HBV SURFACE AG SER QL: <0.1 INDEX
HBV SURFACE AG SER QL: NEGATIVE
HCT VFR BLD AUTO: 43 % (ref 36.6–50.3)
HCV AB SERPL QL IA: NORMAL
HCV IGG SERPL QL IA: NON REACTIVE S/CO RATIO
HGB BLD-MCNC: 14.5 G/DL (ref 12.1–17)
HIV 1+2 AB+HIV1 P24 AG SERPL QL IA: NONREACTIVE
HIV 1/2 RESULT COMMENT: NORMAL
HSV1 IGG SER IA-ACNC: NON REACTIVE
HSV2 IGG SER IA-ACNC: NON REACTIVE
IMM GRANULOCYTES # BLD AUTO: 0.01 K/UL (ref 0–0.04)
IMM GRANULOCYTES NFR BLD AUTO: 0.2 % (ref 0–0.5)
LYMPHOCYTES # BLD: 1.91 K/UL (ref 0.8–3.5)
LYMPHOCYTES NFR BLD: 39.9 % (ref 12–49)
MCH RBC QN AUTO: 32.4 PG (ref 26–34)
MCHC RBC AUTO-ENTMCNC: 33.7 G/DL (ref 30–36.5)
MCV RBC AUTO: 96 FL (ref 80–99)
MONOCYTES # BLD: 0.33 K/UL (ref 0–1)
MONOCYTES NFR BLD: 6.9 % (ref 5–13)
NEUTS SEG # BLD: 2.46 K/UL (ref 1.8–8)
NEUTS SEG NFR BLD: 51.4 % (ref 32–75)
NRBC # BLD: 0 K/UL (ref 0–0.01)
NRBC BLD-RTO: 0 PER 100 WBC
PLATELET # BLD AUTO: 237 K/UL (ref 150–400)
PMV BLD AUTO: 10.8 FL (ref 8.9–12.9)
POTASSIUM SERPL-SCNC: 4.1 MMOL/L (ref 3.5–5.1)
PROT SERPL-MCNC: 7 G/DL (ref 6.4–8.2)
RBC # BLD AUTO: 4.48 M/UL (ref 4.1–5.7)
RPR SER QL: NONREACTIVE
SODIUM SERPL-SCNC: 138 MMOL/L (ref 136–145)
WBC # BLD AUTO: 4.8 K/UL (ref 4.1–11.1)

## 2025-07-10 LAB
C TRACH RRNA SPEC QL NAA+PROBE: NEGATIVE
N GONORRHOEA RRNA SPEC QL NAA+PROBE: NEGATIVE
SPECIMEN SOURCE: NORMAL
T VAGINALIS RRNA SPEC QL NAA+PROBE: NEGATIVE

## 2025-07-14 ENCOUNTER — RESULTS FOLLOW-UP (OUTPATIENT)
Age: 25
End: 2025-07-14

## 2025-07-14 DIAGNOSIS — Z29.81 ENCOUNTER FOR HIV PRE-EXPOSURE PROPHYLAXIS: Primary | ICD-10-CM

## 2025-07-14 RX ORDER — EMTRICITABINE AND TENOFOVIR DISOPROXIL FUMARATE 200; 300 MG/1; MG/1
1 TABLET, FILM COATED ORAL DAILY
Qty: 90 TABLET | Refills: 0 | Status: SHIPPED | OUTPATIENT
Start: 2025-07-14

## 2025-08-26 ENCOUNTER — OFFICE VISIT (OUTPATIENT)
Age: 25
End: 2025-08-26

## 2025-08-26 VITALS
RESPIRATION RATE: 18 BRPM | SYSTOLIC BLOOD PRESSURE: 125 MMHG | OXYGEN SATURATION: 98 % | HEIGHT: 69 IN | DIASTOLIC BLOOD PRESSURE: 72 MMHG | HEART RATE: 51 BPM | BODY MASS INDEX: 24.26 KG/M2 | WEIGHT: 163.8 LBS | TEMPERATURE: 98.2 F

## 2025-08-26 DIAGNOSIS — F07.81 POST CONCUSSIVE SYNDROME: ICD-10-CM

## 2025-08-26 DIAGNOSIS — S06.0X0D CONCUSSION WITHOUT LOSS OF CONSCIOUSNESS, SUBSEQUENT ENCOUNTER: Primary | ICD-10-CM

## 2025-08-26 ASSESSMENT — ENCOUNTER SYMPTOMS
SHORTNESS OF BREATH: 0
COUGH: 0
RHINORRHEA: 0
NAUSEA: 1
CHEST TIGHTNESS: 0
ABDOMINAL PAIN: 0
PHOTOPHOBIA: 1
VOMITING: 0
SORE THROAT: 0

## 2025-08-28 DIAGNOSIS — F07.81 POST CONCUSSIVE SYNDROME: Primary | ICD-10-CM

## 2025-08-29 ENCOUNTER — OFFICE VISIT (OUTPATIENT)
Age: 25
End: 2025-08-29
Payer: COMMERCIAL

## 2025-08-29 VITALS
WEIGHT: 161.2 LBS | OXYGEN SATURATION: 100 % | TEMPERATURE: 97.1 F | DIASTOLIC BLOOD PRESSURE: 80 MMHG | RESPIRATION RATE: 16 BRPM | SYSTOLIC BLOOD PRESSURE: 133 MMHG | BODY MASS INDEX: 23.88 KG/M2 | HEIGHT: 69 IN | HEART RATE: 58 BPM

## 2025-08-29 DIAGNOSIS — G44.319 ACUTE POST-TRAUMATIC HEADACHE, NOT INTRACTABLE: Primary | ICD-10-CM

## 2025-08-29 DIAGNOSIS — S06.0X0A CONCUSSION WITHOUT LOSS OF CONSCIOUSNESS, INITIAL ENCOUNTER: ICD-10-CM

## 2025-08-29 PROCEDURE — 99213 OFFICE O/P EST LOW 20 MIN: CPT | Performed by: NURSE PRACTITIONER

## 2025-09-02 ENCOUNTER — HOSPITAL ENCOUNTER (OUTPATIENT)
Age: 25
Discharge: HOME | End: 2025-09-05
Payer: COMMERCIAL

## 2025-09-02 DIAGNOSIS — G44.319 ACUTE POST-TRAUMATIC HEADACHE, NOT INTRACTABLE: ICD-10-CM

## 2025-09-02 DIAGNOSIS — S06.0X0A CONCUSSION WITHOUT LOSS OF CONSCIOUSNESS, INITIAL ENCOUNTER: ICD-10-CM

## 2025-09-02 PROCEDURE — 70450 CT HEAD/BRAIN W/O DYE: CPT
